# Patient Record
Sex: FEMALE | NOT HISPANIC OR LATINO | Employment: FULL TIME | URBAN - METROPOLITAN AREA
[De-identification: names, ages, dates, MRNs, and addresses within clinical notes are randomized per-mention and may not be internally consistent; named-entity substitution may affect disease eponyms.]

---

## 2017-02-17 PROBLEM — Z34.00 PREGNANCY, FIRST: Status: ACTIVE | Noted: 2017-02-17

## 2020-05-26 PROBLEM — Z37.9 NORMAL LABOR: Status: ACTIVE | Noted: 2020-05-26

## 2020-08-31 PROBLEM — Z37.9 NORMAL LABOR: Status: RESOLVED | Noted: 2020-05-26 | Resolved: 2020-08-31

## 2022-01-08 ENCOUNTER — LAB VISIT (OUTPATIENT)
Dept: FAMILY MEDICINE | Facility: CLINIC | Age: 32
End: 2022-01-08
Payer: OTHER GOVERNMENT

## 2022-01-08 DIAGNOSIS — Z11.52 ENCOUNTER FOR SCREENING FOR COVID-19: Primary | ICD-10-CM

## 2022-01-08 LAB
CTP QC/QA: YES
SARS-COV-2 AG RESP QL IA.RAPID: NEGATIVE

## 2022-01-08 PROCEDURE — 87811 SARS-COV-2 COVID19 W/OPTIC: CPT | Mod: PBBFAC

## 2022-01-08 NOTE — PROGRESS NOTES
Instructions for Patients with Confirmed or Suspected COVID-19    If you are awaiting your test result, you will either be called or it will be released to the patient portal.  If you have any questions about your test, please visit [email or web address] or call our COVID-19 information line at [phone].      Please isolate yourself at home.  You may leave home and/or return to work once the following conditions are met:    If you have symptoms and tested positive:   More than 5 days since symptoms first appeared AND   More than 24 hours fever free without medications AND       symptoms have improved   · For five days after ending isolation, masks are required.    If you had no symptoms but tested positive:   More than 5 days since the date of the first positive test. If you develop symptoms, then use the guidelines above  · For five days after ending isolation, masks are required.      Testing is not recommended if you are symptom free after completing isolation.

## 2023-10-25 PROBLEM — Z34.90 TERM PREGNANCY: Status: ACTIVE | Noted: 2023-10-25
